# Patient Record
Sex: MALE | Race: WHITE | Employment: FULL TIME | URBAN - METROPOLITAN AREA
[De-identification: names, ages, dates, MRNs, and addresses within clinical notes are randomized per-mention and may not be internally consistent; named-entity substitution may affect disease eponyms.]

---

## 2022-08-01 ENCOUNTER — HOSPITAL ENCOUNTER (EMERGENCY)
Age: 37
Discharge: HOME | End: 2022-08-01
Payer: COMMERCIAL

## 2022-08-01 VITALS
OXYGEN SATURATION: 97 % | HEART RATE: 54 BPM | SYSTOLIC BLOOD PRESSURE: 114 MMHG | BODY MASS INDEX: 26.51 KG/M2 | RESPIRATION RATE: 16 BRPM | TEMPERATURE: 98.5 F | DIASTOLIC BLOOD PRESSURE: 73 MMHG | HEIGHT: 73 IN | WEIGHT: 200 LBS

## 2022-08-01 DIAGNOSIS — U07.1 COVID-19: Primary | ICD-10-CM

## 2022-08-01 LAB
INFLUENZA A: NOT DETECTED
INFLUENZA B: NOT DETECTED
SARS-COV-2 RNA, RT PCR: DETECTED
STREP GRP A PCR: NEGATIVE

## 2022-08-01 PROCEDURE — 87636 SARSCOV2 & INF A&B AMP PRB: CPT

## 2022-08-01 PROCEDURE — 99283 EMERGENCY DEPT VISIT LOW MDM: CPT

## 2022-08-01 PROCEDURE — 6370000000 HC RX 637 (ALT 250 FOR IP): Performed by: NURSE PRACTITIONER

## 2022-08-01 PROCEDURE — 87880 STREP A ASSAY W/OPTIC: CPT

## 2022-08-01 RX ORDER — BROMPHENIRAMINE MALEATE, PSEUDOEPHEDRINE HYDROCHLORIDE, AND DEXTROMETHORPHAN HYDROBROMIDE 2; 30; 10 MG/5ML; MG/5ML; MG/5ML
5 SYRUP ORAL 4 TIMES DAILY PRN
Qty: 118 ML | Refills: 0 | Status: SHIPPED | OUTPATIENT
Start: 2022-08-01

## 2022-08-01 RX ORDER — IBUPROFEN 400 MG/1
400 TABLET ORAL ONCE
Status: COMPLETED | OUTPATIENT
Start: 2022-08-01 | End: 2022-08-01

## 2022-08-01 RX ORDER — ALBUTEROL SULFATE 90 UG/1
2 AEROSOL, METERED RESPIRATORY (INHALATION) 4 TIMES DAILY PRN
Qty: 18 G | Refills: 0 | Status: SHIPPED | OUTPATIENT
Start: 2022-08-01

## 2022-08-01 RX ORDER — BUPRENORPHINE AND NALOXONE 8; 2 MG/1; MG/1
1 FILM, SOLUBLE BUCCAL; SUBLINGUAL DAILY
COMMUNITY

## 2022-08-01 RX ORDER — IBUPROFEN 600 MG/1
600 TABLET ORAL 3 TIMES DAILY PRN
Qty: 30 TABLET | Refills: 0 | Status: SHIPPED | OUTPATIENT
Start: 2022-08-01

## 2022-08-01 RX ADMIN — IBUPROFEN 400 MG: 400 TABLET, FILM COATED ORAL at 16:01

## 2022-08-01 ASSESSMENT — PAIN SCALES - GENERAL: PAINLEVEL_OUTOF10: 9

## 2022-08-01 ASSESSMENT — PAIN DESCRIPTION - LOCATION: LOCATION: OTHER (COMMENT)

## 2022-08-01 ASSESSMENT — PAIN DESCRIPTION - DESCRIPTORS: DESCRIPTORS: ACHING

## 2022-08-01 NOTE — Clinical Note
June Mccarty was seen and treated in our emergency department on 8/1/2022. COVID19 virus is suspected. Per the CDC guidelines we recommend home isolation until the following conditions are all met:    1. At least five days have passed since symptoms first appeared and/or had a close exposure,   2. After home isolation for five days, wearing a mask around others for the next five days,  3. At least 24 have passed since last fever without the use of fever-reducing medications and  4. Symptoms (eg cough, shortness of breath) have improved    If you have any questions or concerns, please don't hesitate to call.     He may return to work/school on 08/06/2022        Nik Arias, MAUREEN - CNP

## 2022-08-01 NOTE — ED PROVIDER NOTES
[facility]  Department of Emergency Medicine   ED  Encounter Note  Admit Date/RoomTime: 2022  3:27 PM  ED Room: 15/15    NAME: Carrillo Carney  : 1985  MRN: [id number]     Chief Complaint:  Concern For COVID-19 (Chills, shaky weak , fever, wants tested for covid )    History of Present Illness       Carrillo Carney is a 39 y.o. old male who presents to the emergency department by private vehicle, for COVID testing with known exposure with chills and intermittent ,fever. There has been no abdominal pain, chest tightness, cough, nausea, vomiting, diarrhea, dysuria, earache, fever, malaise, muscle aches, wheezing, loss of taste, or loss of smell. The patient has not received any COVID-19 vaccine    ROS   Pertinent positives and negatives are stated within HPI, all other systems reviewed and are negative. Past Medical History:  has no past medical history on file. Surgical History:  has no past surgical history on file. Social History:  reports that he has been smoking cigarettes. He has a 8.00 pack-year smoking history. He has never used smokeless tobacco. He reports current alcohol use. He reports that he does not use drugs. Family History: family history is not on file. Allergies: Patient has no known allergies. Physical Exam   Oxygen Saturation Interpretation: Normal.        ED Triage Vitals   BP Temp Temp Source Heart Rate Resp SpO2 Height Weight   22 1516 22 1516 22 1516 22 1516 22 1516 22 1516 22 1523 22 1523   114/73 98.5 °F (36.9 °C) Temporal 54 16 97 % 6' 1\" (1.854 m) 200 lb (90.7 kg)         Constitutional:  Alert, development consistent with age. Ears:  External Ears: Bilateral normal.               TM's & External Canals: normal appearance. Nose:   There is no discharge, swelling or lesions noted. Sinuses: no bilateral maxillary sinus tenderness.                     no bilateral frontal sinus tenderness. Mouth:  normal tongue and buccal mucosa. Throat: no erythema or exudates noted. Teeth and gums normal..  Airway patent. Neck:  Supple. There is no  anterior cervical and posterior cervical node tenderness. Respiratory:   Breath sounds: bilateral normal.  Lung sounds: normal.   CV:  Regular rate and rhythm, normal heart sounds, without pathological murmurs, ectopy, gallops, or rubs. GI:  Abdomen Soft, nontender, good bowel sounds. No firm or pulsatile mass. Integument:  Normal turgor. Warm, dry, without visible rash. Neurological:  Oriented. Motor functions intact. Lab / Imaging Results   (All laboratory and radiology results have been personally reviewed by myself)  Labs:  Results for orders placed or performed during the hospital encounter of 08/01/22   Strep Screen Group A Throat    Specimen: Throat   Result Value Ref Range    Strep Grp A PCR Negative Negative   COVID-19 & Influenza Combo    Specimen: Nasopharyngeal Swab   Result Value Ref Range    SARS-CoV-2 RNA, RT PCR DETECTED (A) NOT DETECTED    INFLUENZA A NOT DETECTED NOT DETECTED    INFLUENZA B NOT DETECTED NOT DETECTED       Imaging: All Radiology results interpreted by Radiologist unless otherwise noted. No orders to display       ED Course / Medical Decision Making     Medications   ibuprofen (ADVIL;MOTRIN) tablet 400 mg (400 mg Oral Given 8/1/22 1601)          Medical Decision Making:   Based on high suspicion for COVID-19, testing was obtained and were positive.  symptomatic control with supportive meds is considered appropriate at this time. He is not hypoxic. Patient is well appearing, non toxic, meets criteria for and is appropriate for outpatient management. At this time the patient is without objective evidence of an acute process requiring hospitalization or inpatient management.   They have remained hemodynamically stable throughout their entire ED visit and are stable for discharge with outpatient mouth 3 times daily as needed for Pain, Disp-30 tablet, R-0Print      albuterol sulfate HFA (VENTOLIN HFA) 108 (90 Base) MCG/ACT inhaler Inhale 2 puffs into the lungs 4 times daily as needed for Wheezing, Disp-18 g, R-0Print      brompheniramine-pseudoephedrine-DM (BROMFED DM) 2-30-10 MG/5ML syrup Take 5 mLs by mouth 4 times daily as needed for Congestion or Cough, Disp-118 mL, R-0Print           Electronically signed by MAUREEN Gonzalez CNP   DD: 8/1/22  **This report was transcribed using voice recognition software. Every effort was made to ensure accuracy; however, inadvertent computerized transcription errors may be present.   END OF ED PROVIDER NOTE        MAUREEN Abdi CNP  08/01/22 1926

## 2022-08-01 NOTE — ED NOTES
Started feeling sick on Saturday with c/o dry heaves, headache and fever and chills  @ home COVID testing 1 neg and 1 positive, not around anyone know to have COVID an pt I not vaccinated     Anneliese Hidalgo RN  08/01/22 0866

## 2023-06-29 LAB
ALANINE AMINOTRANSFERASE (SGPT) (U/L) IN SER/PLAS: 13 U/L (ref 10–52)
ALBUMIN (G/DL) IN SER/PLAS: 4.8 G/DL (ref 3.4–5)
ALKALINE PHOSPHATASE (U/L) IN SER/PLAS: 71 U/L (ref 33–120)
ANION GAP IN SER/PLAS: 10 MMOL/L (ref 10–20)
ASPARTATE AMINOTRANSFERASE (SGOT) (U/L) IN SER/PLAS: 19 U/L (ref 9–39)
BILIRUBIN TOTAL (MG/DL) IN SER/PLAS: 0.3 MG/DL (ref 0–1.2)
CALCIUM (MG/DL) IN SER/PLAS: 9.6 MG/DL (ref 8.6–10.3)
CARBON DIOXIDE, TOTAL (MMOL/L) IN SER/PLAS: 31 MMOL/L (ref 21–32)
CHLORIDE (MMOL/L) IN SER/PLAS: 103 MMOL/L (ref 98–107)
CREATININE (MG/DL) IN SER/PLAS: 0.78 MG/DL (ref 0.5–1.3)
ERYTHROCYTE DISTRIBUTION WIDTH (RATIO) BY AUTOMATED COUNT: 12.5 % (ref 11.5–14.5)
ERYTHROCYTE MEAN CORPUSCULAR HEMOGLOBIN CONCENTRATION (G/DL) BY AUTOMATED: 32.8 G/DL (ref 32–36)
ERYTHROCYTE MEAN CORPUSCULAR VOLUME (FL) BY AUTOMATED COUNT: 92 FL (ref 80–100)
ERYTHROCYTES (10*6/UL) IN BLOOD BY AUTOMATED COUNT: 4.98 X10E12/L (ref 4.5–5.9)
GFR MALE: >90 ML/MIN/1.73M2
GLUCOSE (MG/DL) IN SER/PLAS: 80 MG/DL (ref 74–99)
HEMATOCRIT (%) IN BLOOD BY AUTOMATED COUNT: 46 % (ref 41–52)
HEMOGLOBIN (G/DL) IN BLOOD: 15.1 G/DL (ref 13.5–17.5)
HEPATITIS A VIRUS IGM AB PRESENCE IN SER/PLAS BY IMMUNOASSAY: NONREACTIVE
HEPATITIS B VIRUS CORE IGM AB PRESENCE IN SER/PLAS BY IMMUNOASSY: NONREACTIVE
HEPATITIS B VIRUS SURFACE AG PRESENCE IN SERUM: NONREACTIVE
HEPATITIS C VIRUS AB PRESENCE IN SERUM: NONREACTIVE
HIV 1/ 2 AG/AB SCREEN: NONREACTIVE
LEUKOCYTES (10*3/UL) IN BLOOD BY AUTOMATED COUNT: 8.4 X10E9/L (ref 4.4–11.3)
PLATELETS (10*3/UL) IN BLOOD AUTOMATED COUNT: 199 X10E9/L (ref 150–450)
POTASSIUM (MMOL/L) IN SER/PLAS: 4.4 MMOL/L (ref 3.5–5.3)
PROTEIN TOTAL: 7.2 G/DL (ref 6.4–8.2)
SODIUM (MMOL/L) IN SER/PLAS: 140 MMOL/L (ref 136–145)
UREA NITROGEN (MG/DL) IN SER/PLAS: 13 MG/DL (ref 6–23)

## 2025-04-04 ENCOUNTER — APPOINTMENT (OUTPATIENT)
Dept: UROLOGY | Facility: CLINIC | Age: 40
End: 2025-04-04
Payer: COMMERCIAL

## 2025-04-04 DIAGNOSIS — N50.819 PAIN IN TESTICLE, UNSPECIFIED LATERALITY: ICD-10-CM

## 2025-04-04 DIAGNOSIS — N45.3 EPIDIDYMOORCHITIS: Primary | ICD-10-CM

## 2025-04-04 LAB
POC BILIRUBIN, URINE: NEGATIVE
POC BLOOD, URINE: NEGATIVE
POC GLUCOSE, URINE: NEGATIVE MG/DL
POC KETONES, URINE: NEGATIVE MG/DL
POC LEUKOCYTES, URINE: NEGATIVE
POC NITRITE,URINE: NEGATIVE
POC PH, URINE: 7 PH
POC PROTEIN, URINE: NEGATIVE MG/DL
POC SPECIFIC GRAVITY, URINE: 1.02
POC UROBILINOGEN, URINE: 0.2 EU/DL

## 2025-04-04 PROCEDURE — 99204 OFFICE O/P NEW MOD 45 MIN: CPT | Performed by: UROLOGY

## 2025-04-04 PROCEDURE — 81003 URINALYSIS AUTO W/O SCOPE: CPT | Performed by: UROLOGY

## 2025-04-04 RX ORDER — BUPRENORPHINE HYDROCHLORIDE AND NALOXONE HYDROCHLORIDE DIHYDRATE 2; .5 MG/1; MG/1
TABLET SUBLINGUAL
COMMUNITY

## 2025-04-04 RX ORDER — CIPROFLOXACIN 500 MG/1
500 TABLET ORAL 2 TIMES DAILY
Qty: 14 TABLET | Refills: 2 | Status: SHIPPED | OUTPATIENT
Start: 2025-04-04 | End: 2025-04-25

## 2025-04-04 NOTE — PROGRESS NOTES
04/04/2025  39-year-old gentleman presents 1 month right scrotal pain intermittently.  Currently asymptomatic    Exam: Circumcised normal penis, normal left testicles epididymis; normal right testicles, right epididymis slightly enlarged but nontender    We discussed right scrotal pain intermittently  We discussed slightly enlarged right epididymis, epididymoorchitis  We discussed empirical Cipro 500 mg twice a day for 1 week refill x 2  All the questions were answered, the patient expressed understanding and agreed to the plan.    Impression  Right scrotal pain intermittently  Right epididymoorchitis    Plan  Cipro 500 mg twice a day for 1 week, refill x 2  Call back if no help    Chief Complaint   Patient presents with    Testicle Pain     Pt is here today for right sided testicle pain. He states that it started about a month ago but the pain is off and on. He states that it feels like someone is tugging on his testicle. He denies any voiding issues at this time.        Physical Exam     TODAYS LAB RESULTS:  POCT UA Automated manually resulted  Order: [id number]   Status: Final result       Visible to patient: No (inaccessible in [facility])       Next appt: 04/16/2025 at 10:00 AM in Gastroenterology (Ventura Newman MD)       Dx: Pain in testicle, unspecified laterality    0 Result Notes      Component  Ref Range & Units 10:50   POC Glucose, Urine  NEGATIVE mg/dl NEGATIVE   POC Bilirubin, Urine  NEGATIVE NEGATIVE   POC Ketones, Urine  NEGATIVE mg/dl NEGATIVE   POC Specific Gravity, Urine  1.005 - 1.035 1.020   POC Blood, Urine  NEGATIVE NEGATIVE   POC PH, Urine  No Reference Range Established PH 7.0   POC Protein, Urine  NEGATIVE mg/dl NEGATIVE   POC Urobilinogen, Urine  0.2, 1.0 EU/DL 0.2   Poc Nitrite, Urine  NEGATIVE NEGATIVE   POC Leukocytes, Urine  NEGATIVE NEGATIVE             Specimen Collected: 04/04/25 10:50 Last Resulted: 04/04/25 10:50       ASSESSMENT&PLAN:      IMPRESSIONS:

## 2025-04-04 NOTE — PATIENT INSTRUCTIONS
Impression  Right scrotal pain intermittently  Right epididymoorchitis    Plan  Cipro 500 mg twice a day for 1 week, refill x 2  Call back if no help

## 2025-04-16 ENCOUNTER — APPOINTMENT (OUTPATIENT)
Facility: CLINIC | Age: 40
End: 2025-04-16
Payer: COMMERCIAL

## 2025-04-16 VITALS
SYSTOLIC BLOOD PRESSURE: 142 MMHG | OXYGEN SATURATION: 97 % | WEIGHT: 193 LBS | DIASTOLIC BLOOD PRESSURE: 80 MMHG | HEART RATE: 72 BPM | BODY MASS INDEX: 25.58 KG/M2 | HEIGHT: 73 IN

## 2025-04-16 DIAGNOSIS — K59.00 CONSTIPATION, UNSPECIFIED CONSTIPATION TYPE: Primary | ICD-10-CM

## 2025-04-16 DIAGNOSIS — R10.9 ABDOMINAL CRAMPING: ICD-10-CM

## 2025-04-16 PROBLEM — M54.9 CHRONIC NECK AND BACK PAIN: Status: ACTIVE | Noted: 2017-02-13

## 2025-04-16 PROBLEM — M79.7 FIBROMYALGIA: Status: ACTIVE | Noted: 2017-02-13

## 2025-04-16 PROBLEM — M54.2 CHRONIC NECK AND BACK PAIN: Status: ACTIVE | Noted: 2017-02-13

## 2025-04-16 PROBLEM — M25.50 PAIN IN JOINT, MULTIPLE SITES: Status: ACTIVE | Noted: 2017-02-13

## 2025-04-16 PROBLEM — J06.9 URI (UPPER RESPIRATORY INFECTION): Status: RESOLVED | Noted: 2025-04-16 | Resolved: 2025-04-16

## 2025-04-16 PROBLEM — G89.29 CHRONIC NECK AND BACK PAIN: Status: ACTIVE | Noted: 2017-02-13

## 2025-04-16 PROBLEM — M79.18 MYOFASCIAL PAIN: Status: ACTIVE | Noted: 2017-02-13

## 2025-04-16 PROCEDURE — 3008F BODY MASS INDEX DOCD: CPT | Performed by: INTERNAL MEDICINE

## 2025-04-16 PROCEDURE — 99203 OFFICE O/P NEW LOW 30 MIN: CPT | Performed by: INTERNAL MEDICINE

## 2025-04-16 ASSESSMENT — ENCOUNTER SYMPTOMS
SORE THROAT: 0
NERVOUS/ANXIOUS: 0
SEIZURES: 0
BACK PAIN: 1
WHEEZING: 0
COUGH: 0
DIZZINESS: 0
PALPITATIONS: 0
CONFUSION: 0
TROUBLE SWALLOWING: 0
UNEXPECTED WEIGHT CHANGE: 0
ADENOPATHY: 0
MYALGIAS: 0
FATIGUE: 0
CHILLS: 0
ARTHRALGIAS: 0
SHORTNESS OF BREATH: 0
DYSURIA: 0
HEMATURIA: 0
NUMBNESS: 0
VOICE CHANGE: 0
HEADACHES: 0
FEVER: 0
ROS GI COMMENTS: AS DETAILED ABOVE.
BRUISES/BLEEDS EASILY: 0
WEAKNESS: 0

## 2025-04-16 NOTE — PROGRESS NOTES
"    [facility] Gastroenterology    ASSESSMENT and PLAN:       Constantin Seals is a 39 y.o. male with a significant past medical history of fibromyalgia and chronic back pain who presents for consultation requested by his primary care provider (CAMERON Taylor) for the evaluation of a change in bowel movements.       Constipation  Symptoms of constipation with associated abdominal cramping/bloating and no red-flag/alarm symptoms including no weight loss or blood in his stool. Symptoms in the past improved with Mag citrate \"clean out\" which would also argue for constipation as the main cause of his symptoms. Other etiologies such as underlying colonic lesion/neoplasm are less likely in a patient at his age without red-flag symptoms. Will start with medications to improve BMs. Pending response to that treatment could consider colonoscopy for further evaluation.  - start MiraLAX, discussed need to self-titrate that medication  - discussed lifestyle changes for constipation      Follow up in 4 months.        Ventura Newman MD        Senior Attending Physician, Gastroenterology    [facility]    Clinical   [facility] [employer]        Subjective   HISTORY OF PRESENT ILLNESS:     Chief Complaint  Abdominal Pain and change in bowel habits    History Of Present Illness:    Constantin Seals is a 39 y.o. male with a significant past medical history of fibromyalgia and chronic back pain who presents for consultation requested by his primary care provider (CAMERON Taylor) for the evaluation of a change in bowel movements.       There are no notes from his PCP in the EMR. The most recent labs in the EMR are from 2023 and include a normal CBC, normal CMP, and negative acute hepatitis panel.      Today he complains of cramping abdominal pain and increased gas/bloating. This started a couple months ago and he " denies any recent changes in medication or diet. Pain is across his lower abdomen and it has been intermittent. It can be before a BM, but can also be random at times. This has been associated with constipation and he says that usually he will have a BM every 2-3 days, but at times he can go 4-5 days without a BM. Stool is large and very hard. He has to strain to have a BM which then leads to some rectal pain. He has not noticed any blood in his stool. He did try using Mag Citrate once which led to many BMs and he says that after diarrhea resolved he felt better with an improvement in these symptoms. He has never had a colonoscopy.      Patient denies any heartburn/GERD, N/V, dysphagia, odynophagia, diarrhea, hematemesis, hematochezia, melena, or weight loss.      Review of systems:   Review of Systems   Constitutional:  Negative for chills, fatigue, fever and unexpected weight change.   HENT:  Negative for congestion, sneezing, sore throat, trouble swallowing and voice change.    Respiratory:  Negative for cough, shortness of breath and wheezing.    Cardiovascular:  Negative for chest pain, palpitations and leg swelling.   Gastrointestinal:         As detailed above.   Genitourinary:  Negative for dysuria and hematuria.   Musculoskeletal:  Positive for back pain. Negative for arthralgias and myalgias.   Skin:  Negative for pallor and rash.   Neurological:  Negative for dizziness, seizures, syncope, weakness, numbness and headaches.   Hematological:  Negative for adenopathy. Does not bruise/bleed easily.   Psychiatric/Behavioral:  Negative for confusion. The patient is not nervous/anxious.    All other systems reviewed and are negative.      I performed a complete 10 point review of systems and it is negative except as noted in HPI or above.      PAST HISTORIES:       Past Medical History:  Problem List[1]  He has a past medical history of Flank pain (04/16/2025) and URI (upper respiratory infection)  "(04/16/2025).    Past Surgical History:  He has no past surgical history on file.      Social History:  He reports that he has been smoking cigarettes. He has never used smokeless tobacco. He reports current alcohol use. He reports that he does not use drugs.    Family History:  No known family history of GI disease, specifically denies any family history of pancreatitis, Crohn's, colon cancer, gastroesophageal cancer, or ulcerative colitis.    Family History[2]     Allergies:  Amoxicillin, Gabapentin, Ibuprofen, and Naproxen      Objective   OBJECTIVE:       Last Recorded Vitals:  Vitals:    04/16/25 0946   BP: 142/80   Pulse: 72   SpO2: 97%   Weight: 87.5 kg (193 lb)   Height: 1.854 m (6' 1\")     /80   Pulse 72   Ht 1.854 m (6' 1\")   Wt 87.5 kg (193 lb)   SpO2 97%   BMI 25.46 kg/m²      Physical Exam:    Physical Exam  Vitals reviewed.   Constitutional:       General: He is not in acute distress.     Appearance: He is not ill-appearing.   HENT:      Head: Normocephalic and atraumatic.   Eyes:      General: No scleral icterus.  Cardiovascular:      Rate and Rhythm: Normal rate and regular rhythm.      Pulses: Normal pulses.      Heart sounds: Normal heart sounds. No murmur heard.  Pulmonary:      Effort: Pulmonary effort is normal. No respiratory distress.      Breath sounds: Normal breath sounds. No wheezing.   Abdominal:      General: Bowel sounds are normal.      Palpations: Abdomen is soft.      Tenderness: There is no abdominal tenderness. There is no rebound.   Musculoskeletal:         General: No swelling or deformity.   Skin:     General: Skin is warm and dry.      Coloration: Skin is not jaundiced.      Findings: No rash.   Neurological:      General: No focal deficit present.      Mental Status: He is alert and oriented to person, place, and time.   Psychiatric:         Mood and Affect: Mood normal.         Behavior: Behavior normal.         Thought Content: Thought content normal.         " "Judgment: Judgment normal.         Home Medications:  Prior to Admission medications    Medication Sig Start Date End Date Taking? Authorizing Provider   buprenorphine-naloxone (Suboxone) 2-0.5 mg SL tablet Place under the tongue.    Historical Provider, MD   ciprofloxacin (Cipro) 500 mg tablet Take 1 tablet (500 mg) by mouth 2 times a day for 21 days. 4/4/25 4/25/25  Denilson Childs MD         Relevant Results Recent labs reviewed in the EMR.    Lab Results   Component Value Date/Time    WBC 8.4 06/29/2023 1300    HGB 15.1 06/29/2023 1300    MCV 92 06/29/2023 1300     06/29/2023 1300       Lab Results   Component Value Date/Time     06/29/2023 1300    K 4.4 06/29/2023 1300     06/29/2023 1300    BUN 13 06/29/2023 1300    CREATININE 0.78 06/29/2023 1300       Lab Results   Component Value Date/Time    BILITOT 0.3 06/29/2023 1300    ALKPHOS 71 06/29/2023 1300    AST 19 06/29/2023 1300    ALT 13 06/29/2023 1300       No results found for: \"CRP\", \"CALPS\"    Radiology: Imaging reviewed in the EMR.               [1]   Patient Active Problem List  Diagnosis    Chronic neck and back pain    Fibromyalgia    Myofascial pain    Pain in joint, multiple sites   [2] No family history on file.    "

## 2025-04-16 NOTE — PATIENT INSTRUCTIONS
I suspect that constipation is playing a role in your symptoms. For constipation there are some general lifestyle changes that can be helpful which I have listed below.  Start Metamucil powder 1 heaping tablespoonful mixed with 8 ounces of juice or water once daily.  Eat a high fiber diet.  Drink 32-64 oz of water every day.  Get regular exercise to stimulate your bowels.        I recommend that you use MiraLAX for constipation. This is available over the counter. I would recommend that you start taking 1 capful once daily.  If that is not effective after 4-5 days you can increase the dose.  You can increase the dose up to 2 capfuls twice daily if needed.  If the initial dose is causing your stools to be too loose or too many bowel movements then you can decrease the dose as much as you need to, but try to find a dose that you can take every day or every other day that is giving you soft and easy to pass stools.      Follow up in 4 months.

## 2025-04-16 NOTE — LETTER
"April 16, 2025     Cong Giordano MD  [address] [address]    Patient: Constantin Seals   YOB: 1985   Date of Visit: 4/16/2025       Dear Dr. Cong Giordano MD:    Thank you for referring Constantin Seals to me for evaluation. Below are my notes for this consultation.  If you have questions, please do not hesitate to call me. I look forward to following your patient along with you.       Sincerely,     Ventura Newman MD      CC: Susannah Graves APRN-CNP  ______________________________________________________________________________________        [facility] Gastroenterology    ASSESSMENT and PLAN:       Constantin Seals is a 39 y.o. male with a significant past medical history of fibromyalgia and chronic back pain who presents for consultation requested by his primary care provider (Susannah Graves, APRN-CNP) for the evaluation of a change in bowel movements.       Constipation  Symptoms of constipation with associated abdominal cramping/bloating and no red-flag/alarm symptoms including no weight loss or blood in his stool. Symptoms in the past improved with Mag citrate \"clean out\" which would also argue for constipation as the main cause of his symptoms. Other etiologies such as underlying colonic lesion/neoplasm are less likely in a patient at his age without red-flag symptoms. Will start with medications to improve BMs. Pending response to that treatment could consider colonoscopy for further evaluation.  - start MiraLAX, discussed need to self-titrate that medication  - discussed lifestyle changes for constipation      Follow up in 4 months.        Ventura Newman MD        Senior Attending Physician, Gastroenterology    [facility] [place] [facility]    Clinical   [facility] [employer]        Subjective  HISTORY OF PRESENT ILLNESS:     Chief Complaint  Abdominal Pain " and change in bowel habits    History Of Present Illness:    Constantin Seals is a 39 y.o. male with a significant past medical history of fibromyalgia and chronic back pain who presents for consultation requested by his primary care provider (CAMERON Taylor) for the evaluation of a change in bowel movements.       There are no notes from his PCP in the EMR. The most recent labs in the EMR are from 2023 and include a normal CBC, normal CMP, and negative acute hepatitis panel.      Today he complains of cramping abdominal pain and increased gas/bloating. This started a couple months ago and he denies any recent changes in medication or diet. Pain is across his lower abdomen and it has been intermittent. It can be before a BM, but can also be random at times. This has been associated with constipation and he says that usually he will have a BM every 2-3 days, but at times he can go 4-5 days without a BM. Stool is large and very hard. He has to strain to have a BM which then leads to some rectal pain. He has not noticed any blood in his stool. He did try using Mag Citrate once which led to many BMs and he says that after diarrhea resolved he felt better with an improvement in these symptoms. He has never had a colonoscopy.      Patient denies any heartburn/GERD, N/V, dysphagia, odynophagia, diarrhea, hematemesis, hematochezia, melena, or weight loss.      Review of systems:   Review of Systems   Constitutional:  Negative for chills, fatigue, fever and unexpected weight change.   HENT:  Negative for congestion, sneezing, sore throat, trouble swallowing and voice change.    Respiratory:  Negative for cough, shortness of breath and wheezing.    Cardiovascular:  Negative for chest pain, palpitations and leg swelling.   Gastrointestinal:         As detailed above.   Genitourinary:  Negative for dysuria and hematuria.   Musculoskeletal:  Positive for back pain. Negative for arthralgias and myalgias.   Skin:  Negative  "for pallor and rash.   Neurological:  Negative for dizziness, seizures, syncope, weakness, numbness and headaches.   Hematological:  Negative for adenopathy. Does not bruise/bleed easily.   Psychiatric/Behavioral:  Negative for confusion. The patient is not nervous/anxious.    All other systems reviewed and are negative.      I performed a complete 10 point review of systems and it is negative except as noted in HPI or above.      PAST HISTORIES:       Past Medical History:  Problem List[1]  He has a past medical history of Flank pain (04/16/2025) and URI (upper respiratory infection) (04/16/2025).    Past Surgical History:  He has no past surgical history on file.      Social History:  He reports that he has been smoking cigarettes. He has never used smokeless tobacco. He reports current alcohol use. He reports that he does not use drugs.    Family History:  No known family history of GI disease, specifically denies any family history of pancreatitis, Crohn's, colon cancer, gastroesophageal cancer, or ulcerative colitis.    Family History[2]     Allergies:  Amoxicillin, Gabapentin, Ibuprofen, and Naproxen      Objective  OBJECTIVE:       Last Recorded Vitals:  Vitals:    04/16/25 0946   BP: 142/80   Pulse: 72   SpO2: 97%   Weight: 87.5 kg (193 lb)   Height: 1.854 m (6' 1\")     /80   Pulse 72   Ht 1.854 m (6' 1\")   Wt 87.5 kg (193 lb)   SpO2 97%   BMI 25.46 kg/m²      Physical Exam:    Physical Exam  Vitals reviewed.   Constitutional:       General: He is not in acute distress.     Appearance: He is not ill-appearing.   HENT:      Head: Normocephalic and atraumatic.   Eyes:      General: No scleral icterus.  Cardiovascular:      Rate and Rhythm: Normal rate and regular rhythm.      Pulses: Normal pulses.      Heart sounds: Normal heart sounds. No murmur heard.  Pulmonary:      Effort: Pulmonary effort is normal. No respiratory distress.      Breath sounds: Normal breath sounds. No wheezing.   Abdominal: " "     General: Bowel sounds are normal.      Palpations: Abdomen is soft.      Tenderness: There is no abdominal tenderness. There is no rebound.   Musculoskeletal:         General: No swelling or deformity.   Skin:     General: Skin is warm and dry.      Coloration: Skin is not jaundiced.      Findings: No rash.   Neurological:      General: No focal deficit present.      Mental Status: He is alert and oriented to person, place, and time.   Psychiatric:         Mood and Affect: Mood normal.         Behavior: Behavior normal.         Thought Content: Thought content normal.         Judgment: Judgment normal.         Home Medications:  Prior to Admission medications    Medication Sig Start Date End Date Taking? Authorizing Provider   buprenorphine-naloxone (Suboxone) 2-0.5 mg SL tablet Place under the tongue.    Historical Provider, MD   ciprofloxacin (Cipro) 500 mg tablet Take 1 tablet (500 mg) by mouth 2 times a day for 21 days. 4/4/25 4/25/25  Denilson Childs MD         Relevant Results Recent labs reviewed in the EMR.    Lab Results   Component Value Date/Time    WBC 8.4 06/29/2023 1300    HGB 15.1 06/29/2023 1300    MCV 92 06/29/2023 1300     06/29/2023 1300       Lab Results   Component Value Date/Time     06/29/2023 1300    K 4.4 06/29/2023 1300     06/29/2023 1300    BUN 13 06/29/2023 1300    CREATININE 0.78 06/29/2023 1300       Lab Results   Component Value Date/Time    BILITOT 0.3 06/29/2023 1300    ALKPHOS 71 06/29/2023 1300    AST 19 06/29/2023 1300    ALT 13 06/29/2023 1300       No results found for: \"CRP\", \"CALPS\"    Radiology: Imaging reviewed in the EMR.               [1]  Patient Active Problem List  Diagnosis   • Chronic neck and back pain   • Fibromyalgia   • Myofascial pain   • Pain in joint, multiple sites   [2]  No family history on file.       [1]  Patient Active Problem List  Diagnosis   • Chronic neck and back pain   • Fibromyalgia   • Myofascial pain   • Pain in joint, " multiple sites   [2]  No family history on file.

## 2025-06-30 ENCOUNTER — APPOINTMENT (OUTPATIENT)
Dept: PRIMARY CARE | Facility: CLINIC | Age: 40
End: 2025-06-30
Payer: COMMERCIAL

## 2025-06-30 VITALS
SYSTOLIC BLOOD PRESSURE: 122 MMHG | DIASTOLIC BLOOD PRESSURE: 80 MMHG | OXYGEN SATURATION: 96 % | RESPIRATION RATE: 18 BRPM | HEIGHT: 73 IN | BODY MASS INDEX: 25.92 KG/M2 | HEART RATE: 76 BPM | WEIGHT: 195.6 LBS

## 2025-06-30 DIAGNOSIS — Z00.00 HEALTH CARE MAINTENANCE: Primary | ICD-10-CM

## 2025-06-30 DIAGNOSIS — K59.00 CONSTIPATION, UNSPECIFIED CONSTIPATION TYPE: ICD-10-CM

## 2025-06-30 DIAGNOSIS — F17.210 CIGARETTE NICOTINE DEPENDENCE WITHOUT COMPLICATION: ICD-10-CM

## 2025-06-30 PROCEDURE — 99202 OFFICE O/P NEW SF 15 MIN: CPT

## 2025-06-30 PROCEDURE — 3008F BODY MASS INDEX DOCD: CPT

## 2025-06-30 RX ORDER — POLYETHYLENE GLYCOL 3350 17 G/17G
17 POWDER, FOR SOLUTION ORAL DAILY PRN
COMMUNITY

## 2025-06-30 ASSESSMENT — COLUMBIA-SUICIDE SEVERITY RATING SCALE - C-SSRS
2. HAVE YOU ACTUALLY HAD ANY THOUGHTS OF KILLING YOURSELF?: NO
1. IN THE PAST MONTH, HAVE YOU WISHED YOU WERE DEAD OR WISHED YOU COULD GO TO SLEEP AND NOT WAKE UP?: NO
6. HAVE YOU EVER DONE ANYTHING, STARTED TO DO ANYTHING, OR PREPARED TO DO ANYTHING TO END YOUR LIFE?: NO

## 2025-06-30 ASSESSMENT — ENCOUNTER SYMPTOMS
LOSS OF SENSATION IN FEET: 0
DIFFICULTY URINATING: 0
NERVOUS/ANXIOUS: 0
LIGHT-HEADEDNESS: 0
DIZZINESS: 0
HEADACHES: 0
SHORTNESS OF BREATH: 0
HEMATURIA: 0
CONSTIPATION: 1
BLOOD IN STOOL: 0
PALPITATIONS: 0
DEPRESSION: 0
OCCASIONAL FEELINGS OF UNSTEADINESS: 0

## 2025-06-30 ASSESSMENT — ANXIETY QUESTIONNAIRES
7. FEELING AFRAID AS IF SOMETHING AWFUL MIGHT HAPPEN: NOT AT ALL
GAD7 TOTAL SCORE: 0
1. FEELING NERVOUS, ANXIOUS, OR ON EDGE: NOT AT ALL
2. NOT BEING ABLE TO STOP OR CONTROL WORRYING: NOT AT ALL
3. WORRYING TOO MUCH ABOUT DIFFERENT THINGS: NOT AT ALL
6. BECOMING EASILY ANNOYED OR IRRITABLE: NOT AT ALL
4. TROUBLE RELAXING: NOT AT ALL
5. BEING SO RESTLESS THAT IT IS HARD TO SIT STILL: NOT AT ALL

## 2025-06-30 ASSESSMENT — PAIN SCALES - GENERAL: PAINLEVEL_OUTOF10: 0-NO PAIN

## 2025-06-30 ASSESSMENT — PATIENT HEALTH QUESTIONNAIRE - PHQ9
SUM OF ALL RESPONSES TO PHQ9 QUESTIONS 1 AND 2: 0
1. LITTLE INTEREST OR PLEASURE IN DOING THINGS: NOT AT ALL
2. FEELING DOWN, DEPRESSED OR HOPELESS: NOT AT ALL

## 2025-06-30 NOTE — LETTER
June 30, 2025     Patient: Constantin Seals   YOB: 1985   Date of Visit: 6/30/2025       To Whom It May Concern:    Constantin Seals was seen in my clinic on 6/30/2025 at 8:00 am. Please excuse Constantin for his absence from work on this day to make the appointment.    If you have any questions or concerns, please don't hesitate to call.         Sincerely,         Susannah Graves, APRN-CNP        CC: No Recipients

## 2025-06-30 NOTE — ASSESSMENT & PLAN NOTE
-declined, reports nicotine gum did not help previously  -Not interested in other nicotine cessation interventions currently

## 2025-06-30 NOTE — PROGRESS NOTES
"Subjective   Patient ID: Constantin Seals is a 39 y.o. male who presents for new to [facility], Joint Pain (Elbows, and knees), and Constipation (Miralax prescribed from GI).    Constipation  Pertinent negatives include no difficulty urinating.      38 yo male presents to [facility] care. PMH of opiate use disorder (currently on Suboxone) and fibromyalgia. Been on Suboxone for 4-5 years. Smokes 1 pack of cigarettes a day. Denies other substance use.     Dr. York is prescribing him Suboxone.     Review of Systems   Respiratory:  Negative for shortness of breath.    Cardiovascular:  Negative for chest pain, palpitations and leg swelling.   Gastrointestinal:  Positive for constipation. Negative for blood in stool.   Genitourinary:  Negative for difficulty urinating and hematuria.   Neurological:  Negative for dizziness, light-headedness and headaches.   Psychiatric/Behavioral:  The patient is not nervous/anxious.    All other systems reviewed and are negative.      Objective   /80 (BP Location: Left arm, Patient Position: Sitting, BP Cuff Size: Adult)   Pulse 76   Resp 18   Ht 1.854 m (6' 1\")   Wt 88.7 kg (195 lb 9.6 oz)   SpO2 96%   BMI 25.81 kg/m²     Physical Exam  HENT:      Head: Normocephalic.   Cardiovascular:      Rate and Rhythm: Normal rate and regular rhythm.   Pulmonary:      Effort: Pulmonary effort is normal.      Breath sounds: Normal breath sounds.   Abdominal:      Palpations: Abdomen is soft.      Tenderness: There is no guarding.   Musculoskeletal:         General: Normal range of motion.      Right lower leg: No edema.      Left lower leg: No edema.   Skin:     General: Skin is warm and dry.   Neurological:      Mental Status: He is alert and oriented to person, place, and time.   Psychiatric:         Mood and Affect: Mood normal.         Behavior: Behavior normal.         Assessment & Plan  Health care maintenance  -Routine labs ordered  -Smoking cessation discussed and " encouraged    During the course of the visit the patient was educated and counseled about age appropriate screening and preventive services. Completed preventive screenings were documented in the chart and orders were placed for outstanding screenings/procedures as documented in the Assessment and Plan.   Orders:    Comprehensive Metabolic Panel; Future    Hemoglobin A1C; Future    Lipid Panel; Future    CBC and Auto Differential; Future    TSH with reflex to Free T4 if abnormal; Future    Follow Up In [facility] - PCP - Health Maintenance; Future    Cigarette nicotine dependence without complication  -declined, reports nicotine gum did not help previously  -Not interested in other nicotine cessation interventions currently          Constipation, unspecified constipation type  -Saw GI recently and was started on Miralax, doing fine with it

## 2026-06-30 ENCOUNTER — APPOINTMENT (OUTPATIENT)
Dept: PRIMARY CARE | Facility: CLINIC | Age: 41
End: 2026-06-30
Payer: COMMERCIAL